# Patient Record
Sex: MALE | Race: WHITE
[De-identification: names, ages, dates, MRNs, and addresses within clinical notes are randomized per-mention and may not be internally consistent; named-entity substitution may affect disease eponyms.]

---

## 2017-04-23 ENCOUNTER — HOSPITAL ENCOUNTER (EMERGENCY)
Dept: HOSPITAL 74 - FER | Age: 27
Discharge: HOME | End: 2017-04-23
Payer: COMMERCIAL

## 2017-04-23 VITALS — SYSTOLIC BLOOD PRESSURE: 141 MMHG | HEART RATE: 92 BPM | TEMPERATURE: 98.3 F | DIASTOLIC BLOOD PRESSURE: 86 MMHG

## 2017-04-23 VITALS — BODY MASS INDEX: 30.1 KG/M2
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
## 2017-04-23 DIAGNOSIS — F17.210: ICD-10-CM

## 2017-04-23 DIAGNOSIS — S61.211A: Primary | ICD-10-CM
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
## 2017-04-23 PROCEDURE — 2W3KX1Z IMMOBILIZATION OF LEFT FINGER USING SPLINT: ICD-10-PCS

## 2017-04-23 PROCEDURE — 3E0234Z INTRODUCTION OF SERUM, TOXOID AND VACCINE INTO MUSCLE, PERCUTANEOUS APPROACH: ICD-10-PCS

## 2017-04-23 PROCEDURE — 0HQGXZZ REPAIR LEFT HAND SKIN, EXTERNAL APPROACH: ICD-10-PCS

## 2017-04-23 NOTE — PDOC
History of Present Illness





- General


History Source: Patient


Exam Limitations: No Limitations





- History of Present Illness


Initial Comments: 





04/23/17 21:06





The patient is a 26 year old male with no significant past medical history, who 

presents to the ED s/p cut to left index finger. Patient was cutting potatoes 

when he cut himself with a knife. 





Last tetanus shot was in 2008.





<Josh Ochoa - Last Filed: 04/23/17 21:06>





<Kirit Le - Last Filed: 04/24/17 05:48>





- General


Chief Complaint: Injury


Stated Complaint: LEFT INDEX FINGER LACERATION





Past History





<Josh Ochoa - Last Filed: 04/23/17 21:06>





- Immunization History


Td Vaccination: Yes (4/23/2008)





- Psycho/Social/Smoking Cessation Hx


Anxiety: No


Suicidal Ideation: No


Smoking History: Current every day smoker


Number of Cigarettes Smoked Daily: 6


Information on smoking cessation initiated: Yes


'Breaking Loose' booklet given: 04/23/17


Hx Alcohol Use: Yes (SOCIAL)


Drug/Substance Use Hx: No


Substance Use Type: None





<Kirit Le - Last Filed: 04/24/17 05:48>





- Past Medical History


Allergies/Adverse Reactions: 


 Allergies











Allergy/AdvReac Type Severity Reaction Status Date / Time


 


No Known Allergies Allergy   Verified 04/23/17 20:07











Home Medications: 


Ambulatory Orders





NK [No Known Home Medication]  04/23/17 











**Review of Systems





- Review of Systems


Able to Perform ROS?: Yes


Comments:: 





04/23/17 21:06


GENERAL/CONSTITUTIONAL: No fever or chills. No weakness.


HEAD, EYES, EARS, NOSE AND THROAT: No change in vision. No ear pain or 

discharge. No sore throat.


CARDIOVASCULAR: No chest pain or shortness of breath.


RESPIRATORY: No cough, wheezing, or hemoptysis.


GASTROINTESTINAL: No nausea, vomiting, diarrhea or constipation.


GENITOURINARY: No dysuria, frequency, or change in urination.


MUSCULOSKELETAL: No joint or muscle swelling or pain. No neck or back pain.


SKIN: Laceration to the left index finger.


NEUROLOGIC: No headache, vertigo, loss of consciousness, or change in strength/

sensation.


ENDOCRINE: No increased thirst. No abnormal weight change.


HEMATOLOGIC/LYMPHATIC: No anemia, easy bleeding, or history of blood clots.


ALLERGIC/IMMUNOLOGIC: No hives or skin allergy.











<Josh Ochoa - Last Filed: 04/23/17 21:06>





*Physical Exam





- Vital Signs


 Last Vital Signs











Temp Pulse Resp BP Pulse Ox


 


 98.3 F   92 H  15   141/86   100 


 


 04/23/17 20:06  04/23/17 20:06  04/23/17 20:06  04/23/17 20:06  04/23/17 20:06














- Physical Exam


Comments: 





04/23/17 21:07


GENERAL: Awake, alert, and fully oriented, in no acute distress


HEAD: No signs of trauma


EYES: PERRLA, EOMI, sclera anicteric, conjunctiva clear


ENT: Auricles normal inspection, hearing grossly normal, nares patent, 

oropharynx clear without


exudates. Moist mucosa


NECK: Normal ROM, supple, no lymphadenopathy, JVD, or masses


LUNGS: Breath sounds equal, clear to auscultation bilaterally.  No wheezes, and 

no crackles


HEART: Regular rate and rhythm, normal S1 and S2, no murmurs, rubs or gallops


ABDOMEN: Soft, nontender, normoactive bowel sounds.  No guarding, no rebound.  

No masses


EXTREMITIES: Normal range of motion, no edema.  No clubbing or cyanosis. No 

cords, erythema, or


Tenderness. Normal Hands. 


NEUROLOGICAL: Cranial nerves II through XII grossly intact.  Normal speech, 

normal gait


SKIN: 1.5 cm Laceration to the Radial surface of the 2nd digit. Warm, Dry, 

normal turgor.








<Josh Ochoa - Last Filed: 04/23/17 21:06>





- Vital Signs


 Last Vital Signs











Temp Pulse Resp BP Pulse Ox


 


 98.3 F   92 H  15   141/86   100 


 


 04/23/17 20:06  04/23/17 20:06  04/23/17 20:06  04/23/17 20:06  04/23/17 20:06














<Kirit Le - Last Filed: 04/24/17 05:48>





Medical Decision Making





- Medical Decision Making





04/24/17 05:47


lac repaired with 6-0 nylon after local anesthesia, wound irrigation, 

exploration.


splint to optimize healing





tetanus administered





<Kirit Le - Last Filed: 04/24/17 05:48>





*DC/Admit/Observation/Transfer





- Attestations


Scribe Attestion: 





04/23/17 21:07





Documentation prepared by Josh Ochoa, acting as medical scribe for 

Emergency Dept,Physician, MD.





<Josh Ochoa - Last Filed: 04/23/17 21:06>





<Kirit Le - Last Filed: 04/24/17 05:48>


Diagnosis at time of Disposition: 


 Laceration





- Discharge Dispostion


Disposition: HOME


Condition at time of disposition: Good





- Patient Instructions


Printed Discharge Instructions:  DI for Laceration Repair -- Simple


Additional Instructions: 


stitches out in 1 week.


we gave you a tetanus shot today

## 2017-05-01 ENCOUNTER — HOSPITAL ENCOUNTER (EMERGENCY)
Dept: HOSPITAL 74 - FER | Age: 27
Discharge: HOME | End: 2017-05-01
Payer: COMMERCIAL

## 2017-05-01 VITALS — HEART RATE: 73 BPM | TEMPERATURE: 98.1 F | DIASTOLIC BLOOD PRESSURE: 71 MMHG | SYSTOLIC BLOOD PRESSURE: 131 MMHG

## 2017-05-01 VITALS — BODY MASS INDEX: 30.8 KG/M2

## 2017-05-01 DIAGNOSIS — Z48.02: Primary | ICD-10-CM

## 2018-07-22 ENCOUNTER — HOSPITAL ENCOUNTER (EMERGENCY)
Dept: HOSPITAL 74 - FER | Age: 28
Discharge: HOME | End: 2018-07-22
Payer: COMMERCIAL

## 2018-07-22 VITALS — SYSTOLIC BLOOD PRESSURE: 121 MMHG | TEMPERATURE: 98.5 F | HEART RATE: 70 BPM | DIASTOLIC BLOOD PRESSURE: 76 MMHG

## 2018-07-22 VITALS — BODY MASS INDEX: 33 KG/M2

## 2018-07-22 DIAGNOSIS — S93.402A: Primary | ICD-10-CM
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
## 2018-07-22 NOTE — PDOC
Attending Attestation





- Resident


Resident Name: Alia Foy





- ED Attending Attestation


I have performed the following: I have examined & evaluated the patient, The 

case was reviewed & discussed with the resident, I agree w/resident's findings 

& plan, Exceptions are as noted





- HPI


HPI: 





07/22/18 11:33


28-year-old male no past medical history here today following a left ankle 

injury. Patient states he is playing softball the day prior to dirt was loose 

and he salsalate twisted and inverted his left ankle initially felt pain 

however pain was mild today he awoke with more swelling and pain has been 

ambulating with a limp denies any knee or hip pain no other injuries no prior 

ankle surgeries or prior ankle injuries. Did not take anything for pain prior 

to arrival





- Physicial Exam


PE: 





07/22/18 11:34


Patient is awake alert no acute distress lungs are clear bilaterally heart is 

regular without any murmurs rubs or gallops. Extremity exam reveals the left 

ankle has lateral malleoli are N talofibular ligament tenderness there is 

positive swelling no ecchymosis pain with inversion no medial or posterior 

malleoli tenderness the foot is nontender to plus DP PT pulses neurovascularly 

intact. The left knee is nontender with full range of motion hip is nontender 

full range of motion skin is intact





- Medical Decision Making





07/22/18 11:35


Differential diagnosis includes sprain versus fracture. Plan x-ray of the left 

ankle pain controlled ibuprofen. If x-rays are negative we'll DC with Aircast 

crutches and orthopedic follow-up as needed





X-rays negative for acute fracture given Aircast crutches and follow-up with 

Dr. Luna

## 2018-07-22 NOTE — PDOC
History of Present Illness





- General


Chief Complaint: Pain


Stated Complaint: LEFT ANKLE PAIN


History Source: Patient


Exam Limitations: No Limitations





- History of Present Illness


Initial Comments: 


Pt, with no significant PMH, presents with L ankle pain since yesterday 

morning. Pt states that 10 am day before presentation, he inverted his ankle 

while playing softball and running through soft dirt. The pt took tylenol and 

iced the ankle the night before presentation, but woke with worsening swelling 

and pain, especially around the lateral aspect of the ankle. The pain is 

throbbing and constant, currently rated 2/10, and is worsened with movement of 

the foot and bearing weight. Alleviating factors are ice and rest. He denies 

fevers/chills, nausea/vomiting, changes to BM or urination, and has been 

tolerating PO intake. There has been no change in color or sensation in the L 

foot.


07/22/18 12:49














Past History





- Travel


Traveled outside of the country in the last 30 days: No


Close contact w/someone who was outside of country & ill: No





- Past Medical History


Allergies/Adverse Reactions: 


 Allergies











Allergy/AdvReac Type Severity Reaction Status Date / Time


 


No Known Allergies Allergy   Verified 07/22/18 11:01











Home Medications: 


Ambulatory Orders





NK [No Known Home Medication]  04/23/17 








COPD: No


DVT: No


Diabetes: No


HTN: No


Hypercholesterolemia: No


Other medical history: DENIES





- Surgical History


Orthopedic Surgery: No (Prior R ACL tear, no surgery)





- Immunization History


Td Vaccination: Yes (4/23/2008)


Immunization Up to Date: Yes (2017)





- Suicide/Smoking/Psychosocial Hx


Smoking History: Current every day smoker


Number of Cigarettes Smoked Daily: 5


Information on smoking cessation initiated: Yes


'Breaking Loose' booklet given: 07/22/18


Hx Alcohol Use: Yes


Drug/Substance Use Hx: No


Substance Use Type: Alcohol





**Review of Systems





- Review of Systems


Able to Perform ROS?: Yes


Is the patient limited English proficient: No


Constitutional: Yes: Weight Stable.  No: Chills, Fever, Loss of Appetite


HEENTM: No: Blurred Vision, Recent change in vision, Hearing Loss, Difficulty 

Swallowing


Respiratory: No: Cough, Orthopnea, Shortness of Breath


Cardiac (ROS): No: Chest Pain, Edema, Irregular Heart Rate, Lightheadedness, 

Syncope


ABD/GI: No: Constipated, Diarrhea, Nausea, Poor Appetite, Poor Fluid Intake, 

Vomiting


: No: Burning, Dysuria, Frequency, Pain, Urgency


Musculoskeletal: Yes: Joint Pain (L ankle swelling and pain x 1 day), Joint 

Swelling.  No: Back Pain


Integumentary: No: Bruising, Change in Color, Erythema, Rash


Neurological: No: Headache, Paresthesia (No numbness/tingling of L ankle or foot

), Weakness, Dizziness


Endocrine: No: Change in Weight


Hematologic/Lymphatic: No: Anemia, Blood Clots, Easy Bleeding


All Other Systems: Reviewed and Negative





*Physical Exam





- Vital Signs


 Last Vital Signs











Temp Pulse Resp BP Pulse Ox


 


 98.5 F   70   15   121/76   98 


 


 07/22/18 11:01 07/22/18 11:01 07/22/18 11:01 07/22/18 11:01 07/22/18 11:01














- Physical Exam


General Appearance: Yes: Nourished, Appropriately Dressed.  No: Apparent 

Distress (Pt comfortable, can sit up in bed and was able to bear weight into 

the ER)


HEENT: positive: EOMI, Normal ENT Inspection, Normal Voice, Symmetrical, 

Pharynx Normal, Hearing Grossly Normal


Neck: positive: Trachea midline, Normal Thyroid, Supple.  negative: Tender, 

Rigid


Respiratory/Chest: positive: Lungs Clear, Normal Breath Sounds.  negative: 

Chest Tender, Respiratory Distress, Accessory Muscle Use


Cardiovascular: positive: Regular Rhythm, Regular Rate, S1, S2.  negative: Edema

, JVD, Murmur


Vascular Pulses: Dorsalis-Pedis (R): 4+, Doralis-Pedis (L): 4+ (Intact DP and 

tibial of L foot)


Gastrointestinal/Abdominal: positive: Normal Bowel Sounds, Flat, Soft.  negative

: Tender, Organomegaly, Pulsatile Mass


Lymphatic: negative: Adenopathy, Tenderness


Musculoskeletal: positive: Decreased Range of Motion (Edema surrounding lateral 

malleolus. Decreased flexion, extension, inversion/eversion of L foot. Negative 

carias sign. ).  negative: CVA Tenderness


Extremity: positive: Normal Capillary Refill, Normal Inspection (L foot warm, 

intact pulses, no cyanosis), Pelvis Stable.  negative: Normal Range of Motion, 

Tender, Cyanosis, Calf Tenderness (Negative Carias L foot)


Integumentary: positive: Normal Color, Dry, Warm.  negative: Ecchymosis, 

Bruising


Neurologic: positive: CNs II-XII NML intact, Fully Oriented, Alert, Normal Mood/

Affect, Normal Response, Motor Strength 5/5





ED Treatment Course





- RADIOLOGY


Radiology Studies Ordered: 


L ankle x-ray.


07/22/18 18:40





Radiograph Interpretation: 


No acute fracture. Prominent tissue swelling surrounding lateral malleolus (

edema). 


07/22/18 18:40








Medical Decision Making





- Medical Decision Making


Pt seen, vitals stable and pain level is 2. Ordered L ankle x-ray. L ankle had 

palpable dorsalis pedis and tibial pulse, no discoloration of the foot or 

ankle. X-ray showed no acute fractures. Will discharge to home as L ankle sprain

, with follow-up in orthopedic clinic (Dr. Luna) as soon as possible. 





Offered motrin for pain control and pain is tolerable, level 2. 


07/22/18 11:42





(Entered later). X-ray of L ankle negative for fracture, likely L ankle sprain. 

Pt accepted motrin for pain, and pain level is improved. Pt will follow-up in 

ortho clinic with Dr. Luna. He was provided work note (pt is ) 

for 2 days until he can see the orthopedic clinic. Verbal and written follow-up 

precautions provided to pt for worsening pain, swelling, fevers/chills, cyanosis

, or other concerns.








*DC/Admit/Observation/Transfer


Diagnosis at time of Disposition: 


Left ankle sprain


Qualifiers:


 Encounter type: initial encounter Involved ligament of ankle: unspecified 

ligament Qualified Code(s): S93.402A - Sprain of unspecified ligament of left 

ankle, initial encounter








- Discharge Dispostion


Disposition: HOME


Condition at time of disposition: Stable


Decision to Admit order: No





- Referrals


Referrals: 


Matt Luna MD [Staff Physician] - 





- Patient Instructions


Printed Discharge Instructions:  DI for Ankle Sprain


Additional Instructions: 


You were seen in the ER today for pain in your ankle. Please follow-up with Dr. Luna in orthopedics for further work-up of your ankle. Please return to see 

us if you have any worsening pain or swelling, discoloration of the foot or 

ankle, fevers/chills, or any other concerns.





- Post Discharge Activity


Forms/Work/School Notes:  Back to Work

## 2021-11-26 NOTE — PDOC
Let me know if the acne does not improve with the higher potency retin A.    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    Learning About the Mediterranean Diet  What is the Mediterranean diet?    The Mediterranean diet is a style of eating rather than a diet plan. It features foods eaten in [place], [place], southern [place] and [place], [place], and other countries along the [place] Sea. It emphasizes eating foods like fish, fruits, vegetables, beans, high-fiber breads and whole grains, nuts, and olive oil. This style of eating includes limited red meat, cheese, and sweets.    Why choose the Mediterranean diet?  A Mediterranean-style diet may improve heart health. It contains more fat than other heart-healthy diets. But the fats are mainly from nuts, unsaturated oils (such as fish oils and olive oil), and certain nut or seed oils (such as canola, soybean, or flaxseed oil). These fats may help protect the heart and blood vessels.    How can you get started on the Mediterranean diet?  Here are some things you can do to switch to a more Mediterranean way of eating. Try to have at least 90% of your weekly meals adhere to the Mediterranean diet, and treat yourself to whatever else you may enjoy for up to 2 meals per week.    What to eat   -Eat a variety of fruits and vegetables each day, such as grapes, blueberries, tomatoes, broccoli, peppers, figs, olives, spinach, eggplant, beans, lentils, and chickpeas.  - Eat a variety of whole-grain foods each day, such as oats, brown rice, and whole wheat bread, pasta, and couscous.  - Eat fish at least 2 times a week. Try tuna, salmon, mackerel, lake trout, herring, or sardines.  - Eat moderate amounts of low-fat dairy products, such as milk, cheese, or yogurt.  - Eat moderate amounts of poultry and eggs.  - Choose healthy (unsaturated) fats, such as nuts, olive oil, and certain nut or seed oils like canola, soybean, and flaxseed.  - Limit unhealthy (saturated)  Suture Removal/Wound Check HPI





- History of Present Illness


Chief Complaint: Suture/Staple Removal(Here)


Stated Complaint: SUTURE REMOVAL


Time Seen by Provider: 05/01/17 08:25


History Source: Yes: Patient


Exam Limitations: Yes: No Limitations


Treated at: [facility] ED


Date of Last ED visit: 04/23/17





- Previous ED Treatment


Tetanus Immunization: Yes: Up to Date


Antibiotics Prescribed: No





- Onset of Previous Treatment


Date of Occurence: 04/23/17





Past History





- Past Medical History


Allergies/Adverse Reactions: 


Allergies





No Known Allergies Allergy (Verified 04/23/17 20:07)


 








Home Medications: 


Ambulatory Orders





NK [No Known Home Medication]  04/23/17 











- Immunization History


Tetanus Status: More than 5 years





- Social History


Smoking Status: Current every day smoker


Number of Ciarettes Per Day: 6





Medical Decision Making





- Medical Decision Making





05/01/17 08:34


25 yo RHD M presenting to the ER for suture removal


Laceration sustained of the left index finger while cutting potatoes


No surrounding erythema or drainage


No fevers or chills


Pt mentions some distal index finger numbness


Pt asked to follow up with Dr Goldberg








*DC/Admit/Observation/Transfer


Diagnosis at time of Disposition: 


 Laceration





- Discharge Dispostion


Disposition: HOME


Condition at time of disposition: Stable


Admit: No





- Referrals


Referrals: 


Goldberg,Neal D, MD [Staff Physician] - 





- Patient Instructions


Printed Discharge Instructions:  DI for Suture Removal


Additional Instructions: 


Thank you for coming in to the ER today


Please avoid re injuring your finger


please follow up with the hand specialist regarding your numbness fats, such as butter, palm oil, and coconut oil. And limit fats found in animal products, such as meat and dairy products made with whole milk. Try to eat red meat only a few times a month in very small amounts.  - Limit sweets and desserts to only a few times a week. This includes sugar-sweetened drinks like soda.    The Mediterranean diet may also include red wine with your meal-1 glass each day for women and up to 2 glasses a day for men. However, there really is no safe amount of alcohol for people. While red wine and some alcohol may benefit your heart, alcohol also directly causes 7 types of cancer in the body. Limiting your exposure over time to carcinogens like alcohol can reduce your risk for cancer.    Tips for eating at home  - Use herbs, spices, garlic, lemon zest, and citrus juice instead of salt to add flavor to foods.  - Add avocado slices to your sandwich instead of woods.  - Have fish for lunch or dinner instead of red meat. Brush the fish with olive oil, and broil or grill it.  - Sprinkle your salad with seeds or nuts instead of cheese.  - Cook with olive or canola oil instead of butter or oils that are high in saturated fat.  - Switch from 2% milk or whole milk to 1% or fat-free milk.  - Dip raw vegetables in a vinaigrette dressing or hummus instead of dips made from mayonnaise or sour cream.  - Have a piece of fruit for dessert instead of a piece of cake. Try baked apples, or have some dried fruit.    Tips for eating out  - Try broiled, grilled, baked, or poached fish instead of having it fried or breaded.  - Ask your  to have your meals prepared with olive oil instead of butter.  - Order dishes made with marinara sauce or sauces made from olive oil. Avoid sauces made from cream or mayonnaise.  - Choose whole-grain breads, whole wheat pasta and pizza crust, brown rice, beans, and lentils.  - Cut back on butter or margarine on bread. Instead, you can dip your bread in a small amount of olive  oil.  - Ask for a side salad or grilled vegetables instead of french fries or chips.    Eating Plan:  -- Limit your calories to 6899-0806 calories per day.    CARBOHYDRATES:  --The best source of carbohydrates are vegetables. At a minimum, 1/2 of your plate should be fresh vegetables.  -Aim to eat 1 pound of cooked vegetables and 1 pound of raw vegetables per day.    Potatoes, peas, and corn are starches and do not count as vegetables.    Limit other carbohydrates to healthy whole grains such as black beans, kidney beans, chickpeas, lentils, farro, quinoa, rolled/traditional oats, barley. These should only be about 1/4 of your plate at lunch and dinner. If you do eat potatoes, sweet potatoes are better than white potatoes.  --Avoid refined grains/flours. When you do eat grains turned into flour it should be 100% whole wheat bread without added sugar, whole wheat pasta, brown instead of white rice, etc.  --Limit added sugars in foods to fewer than 6 teaspoons(24 grams) per day.  --Avoid all sugared beverages including fruit juice, energy drinks, soda, sweetened tea. Avoid diet drinks as the fake sugars stimulate your cravings for other unhealthy carbohydrates. Just drink water and 1 cup of low fat milk (1% or less) daily.  --Eat 1-2 pieces of whole fruits per day, berries are best. Limit bananas, oranges, pineapple, and hema. Greatly limit dried fruits and avoid canned fruits and fruit juice.    FATS and PROTEIN:  --Limit fat intake, eat low fat dairy in moderation, limit cheese, fatty meat/red meat, eat lean white meat such as chicken, fish, turkey, and animal proteins should only be 1/4 of your plate.  --Healthy fats in moderation include avocado, raw nuts 1-2 ounces per day such as raw unsalted almonds, walnuts, flax seeds, and pumpkin seeds.  --Eggs are healthy in moderation, 1-2 eggs per day is ok.  --Limit saturated fats such as butter, cheese. Avoid hydrogenated oils in processed peanut butter and  margarine.  --Peanut or other nut butters that only contain nuts and no or moderate amounts of salt are acceptable.      For more information about a healthy, plant-based, nutrient rich diet, read the updated dietary guidelines for 1972-1651 from the FDA, which endorse a Mediterranean Diet lifestyle:    https://www.dietaryguidelines.gov/sites/default/files/2020-12/Dietary_Guidelines_for_Americans_2020-2025.pdf      Tips for Living a Healthy Life:    PHYSICAL ACTIVITY  This is one of the most important things you can do to help live a long and healthy life.  Physical activity burns calories, increases your metabolism, gives you more energy, AND improves your mood.  It also lowers your risk of heart disease, stroke, diabetes, high blood pressure, and dementia!    Your eventual goal should be 30-45 minutes of aerobic activity per day, most days of the week.  Aerobic activity means getting your heart rate up into the target zone, or exercising briskly enough that you are a little short of breath/unable to speak in complete sentences.  If you are not exercising at all right now, you should pick a smaller goal to start with- try 20 minutes three times per week, and increase by 10 minutes and one extra day per week every two weeks or so.    Unfortunately it takes up to two to six weeks of regular activity before your metabolism increases, but only about four days of inactivity for your body to decide to slow it back down.  Nature is unfair to your weight management efforts here, so it’s important not to go more than three days without getting 30 minutes of aerobic activity.    HEALTHY DIET  *Be aware of what 'normal' portion size is.  Measuring or weighing food can help.  You can find a nice portion size guide at:   http://www.webmd.com/diet/healthtool-portion-size-plate  *Eating on a salad or bread plate, rather then a dinner plate, can help control portion size.  *Drink at least eight 8-ounce glasses of water each  day.  *Cut out liquid calories, meaning soda, juice, and other sugary beverages like lattes  *Choose unlimited amounts of vegetables and salads, but limit the amount of butter, dressings, and sauces you eat with these foods.    Work on healthy eating patterns  Half of your plate at lunch and dinner should be vegetables (real ones like broccoli, cauliflower, zucchini, or peppers- potatoes/corn/peas are starches and don't count!).  Sit down and relax while you eat your meals.  Avoid distractions such as the phone and TV.  Chew your food slowly and put down your utensil between bites.  This allows time for your body to digest the food, and for you to listen to signals that your stomach is starting to feel full.  You should feel good after eating, rather than bloated, stuffed, or uncomfortable.  Avoid eating within 2-3 hours of bedtime.    ALCOHOL CONSUMPTION:  This is a controversial topic as some studies have shown health benefits from moderate alcohol consumption (not more than 2 drinks per day for men or 1 per day for women).  However, alcohol is known to cause multiple types of cancer, so there is technically no safe amount of alcohol consumption.  If you are going to drink, you should stick to the guidelines above.    SUN EXPOSURE:  Excess sun exposure is known to cause skin cancer.  This applies to tanning beds as well, which should be avoided.  When out in the sun, you should make sure to use at least SPF 30 sunblock and to re-apply every couple hours as directed on the bottle.  Try to wear hats and keep skin covered with clothing when possible, especially if outside between the hours of 10:00 and 4:00.    TOBACCO AVOIDANCE:  You should strictly avoid smoking cigarettes, cigars, vaping, and chewing.  Do not allow others to smoke around you either.  Please let me know if you are interested in medication or other products like patches/gum to help with quitting!    SAFE SEX/CONTRACEPTION/PREGNANCY  PLANNING:  Regular use of condoms is recommended to prevent the development of sexually-transmitted diseases.     It is also recommended that heterosexual couples use an additional form of birth control unless trying for pregnancy.  Condoms are only about 85% effective at preventing pregnancy, even with ideal use, which means 15 out of every 100 couples will end up pregnant by using that method of contraception alone.    If you are a female and you are intending pregnancy, or not using any birth control apart from condoms, you should take an over the counter prenatal vitamin regularly.  Do not wait until you are already pregnant to start taking it.  You should also avoid alcohol consumption as there is no safe amount of alcohol in pregnancy.     TRANSPORTATION SAFETY  * Riding a bike is a great way to get around while getting some exercise at the same time, but you should always remember to wear a helmet while doing so!    * Always wear your seatbelt whenever you're in the car, even if you're just going down the street.    * Never get in a car if you or the  of the vehicle are under the influence of alcohol, marijuana, or other drugs.     DENTAL HEALTH  You should see a dentist 1-2 times per year for a regular check up and cleaning.  This is especially important for smokers who are at higher risk of developing oral cancers.  We have also learned that there is a link between poor dental health and several seemingly unrelated health conditions like  labor and heart disease.  So keep your teeth in good shape!

## 2024-08-22 PROBLEM — Z00.00 ENCOUNTER FOR PREVENTIVE HEALTH EXAMINATION: Status: ACTIVE | Noted: 2024-08-22

## 2024-10-03 ENCOUNTER — APPOINTMENT (OUTPATIENT)
Dept: PULMONOLOGY | Facility: CLINIC | Age: 34
End: 2024-10-03
Payer: COMMERCIAL

## 2024-10-03 VITALS — HEIGHT: 70 IN | BODY MASS INDEX: 31.5 KG/M2 | WEIGHT: 220 LBS

## 2024-10-03 DIAGNOSIS — G47.19 OTHER HYPERSOMNIA: ICD-10-CM

## 2024-10-03 DIAGNOSIS — Z78.9 OTHER SPECIFIED HEALTH STATUS: ICD-10-CM

## 2024-10-03 DIAGNOSIS — Z87.891 PERSONAL HISTORY OF NICOTINE DEPENDENCE: ICD-10-CM

## 2024-10-03 DIAGNOSIS — G47.33 OBSTRUCTIVE SLEEP APNEA (ADULT) (PEDIATRIC): ICD-10-CM

## 2024-10-03 PROCEDURE — 99213 OFFICE O/P EST LOW 20 MIN: CPT

## 2024-10-03 NOTE — PHYSICAL EXAM
[Low Lying Soft Palate] : low lying soft palate [Enlarged Base of the Tongue] : enlargement of the base of the tongue [III] : III [General Appearance - Well Developed] : well developed [General Appearance - Well Nourished] : well nourished [Heart Sounds] : normal S1 and S2 [Murmurs] : no murmurs [] : no respiratory distress [Auscultation Breath Sounds / Voice Sounds] : lungs were clear to auscultation bilaterally [No Focal Deficits] : no focal deficits [Oriented To Time, Place, And Person] : oriented to person, place, and time [Memory Remote] : remote memory was not impaired

## 2024-10-03 NOTE — HISTORY OF PRESENT ILLNESS
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  Patient is sleepy with [place] sleepiness score of 12.  Patient has very loud snoring which disturbs his wife, also has witnessed apneas.  Patient's bedtime is around 9 PM wakes up in the morning around 4.30 AM.   He feels tired when he wakes up.  Patient drinks 1 cup of coffee during the daytime. Patient does not have any headaches or nocturia. He is sleepy while driving sometimes. STOPBANG score - 4 neck size - 16 inches Patient used nasal strips, flonase and mouth guard without success.  sleep study showed mild sleep apnea.

## 2024-10-03 NOTE — ASSESSMENT
[FreeTextEntry1] : The patient exhibits mild obstructive sleep apnea with notable symptoms including excessive snoring and daytime sleepiness, despite previous attempts at conservative management. Plan:  CPAP Therapy: Discussed the initiation of CPAP therapy to manage sleep apnea effectively, given the patient's symptoms and AHI findings. Education on CPAP Use: Explain the importance of CPAP compliance and potential benefits for improving sleep quality and reducing daytime sleepiness. ordered autopap 5-20 cm of pressure Lifestyle Modifications: Encourage weight management strategies and sleep hygiene practices, including maintaining a consistent sleep schedule and reducing alcohol intake. Follow-Up: Schedule a follow-up appointment in 4-6 weeks to assess response to CPAP therapy and discuss any adjustments needed.

## 2024-12-17 ENCOUNTER — APPOINTMENT (OUTPATIENT)
Dept: PULMONOLOGY | Facility: CLINIC | Age: 34
End: 2024-12-17
Payer: COMMERCIAL

## 2024-12-17 VITALS
RESPIRATION RATE: 16 BRPM | OXYGEN SATURATION: 98 % | BODY MASS INDEX: 31.5 KG/M2 | HEART RATE: 79 BPM | SYSTOLIC BLOOD PRESSURE: 100 MMHG | WEIGHT: 220 LBS | HEIGHT: 70 IN | DIASTOLIC BLOOD PRESSURE: 64 MMHG

## 2024-12-17 DIAGNOSIS — G47.19 OTHER HYPERSOMNIA: ICD-10-CM

## 2024-12-17 DIAGNOSIS — G47.33 OBSTRUCTIVE SLEEP APNEA (ADULT) (PEDIATRIC): ICD-10-CM

## 2024-12-17 PROCEDURE — 99213 OFFICE O/P EST LOW 20 MIN: CPT

## 2024-12-25 PROBLEM — F10.90 ALCOHOL USE: Status: ACTIVE | Noted: 2024-10-03

## 2025-01-13 ENCOUNTER — APPOINTMENT (OUTPATIENT)
Dept: PULMONOLOGY | Facility: CLINIC | Age: 35
End: 2025-01-13
Payer: COMMERCIAL

## 2025-01-13 DIAGNOSIS — G47.19 OTHER HYPERSOMNIA: ICD-10-CM

## 2025-01-13 DIAGNOSIS — G47.33 OBSTRUCTIVE SLEEP APNEA (ADULT) (PEDIATRIC): ICD-10-CM

## 2025-01-13 PROCEDURE — 99213 OFFICE O/P EST LOW 20 MIN: CPT
